# Patient Record
(demographics unavailable — no encounter records)

---

## 2018-02-25 NOTE — UC
Throat Pain/Nasal Neftaly HPI





- HPI Summary


HPI Summary: 





19 yo female wtih sore throat x 1 day


no f/c











- History of Current Complaint


Chief Complaint: UCGeneralIllness


Stated Complaint: SORE THROAT


Time Seen by Provider: 02/25/18 15:04


Hx Obtained From: Patient


Hx Last Menstrual Period: 2/22/18


Onset/Duration: Gradual Onset, Lasting Hours


Severity: Moderate


Pain Intensity: 7


Pain Scale Used: 0-10 Numeric


Cough: None





- Allergies/Home Medications


Allergies/Adverse Reactions: 


 Allergies











Allergy/AdvReac Type Severity Reaction Status Date / Time


 


No Known Allergies Allergy   Verified 02/25/18 15:09











Home Medications: 


 Home Medications





Oral Contraceptives DAILY 02/25/18 [History]











PMH/Surg Hx/FS Hx/Imm Hx


Previously Healthy: Yes





- Surgical History


Surgical History: None





- Family History


Known Family History: Positive: Cardiac Disease, Hypertension, Diabetes





- Social History


Alcohol Use: Rare


Substance Use Type: None


Smoking Status (MU): Never Smoked Tobacco





Review of Systems


Constitutional: Negative


Skin: Negative


Eyes: Negative


ENT: Sore Throat


Respiratory: Negative


Cardiovascular: Negative


Gastrointestinal: Negative


Genitourinary: Negative


Motor: Negative


Neurovascular: Negative


Musculoskeletal: Negative


Neurological: Negative


Psychological: Negative


Is Patient Immunocompromised?: No


All Other Systems Reviewed And Are Negative: Yes





Physical Exam


Triage Information Reviewed: Yes


Appearance: Well-Appearing, No Pain Distress, Well-Nourished


Vital Signs: 


 Initial Vital Signs











Temp  99.9 F   02/25/18 15:05


 


Pulse  80   02/25/18 15:05


 


Resp  16   02/25/18 15:05


 


BP  120/74   02/25/18 15:05


 


Pulse Ox  100   02/25/18 15:05











Eyes: Positive: Conjunctiva Clear


ENT: Positive: Hearing grossly normal, Tonsillar swelling, Tonsillar exudate.  

Negative: Nasal congestion, Nasal drainage, Muffled voice, Hoarse voice, Sinus 

tenderness, Uvula midline


Neck: Positive: Supple, Enlarged Nodes @ - ant cerv


Respiratory: Positive: Lungs clear, Normal breath sounds, No respiratory 

distress


Cardiovascular: Positive: RRR, No Murmur


Abdomen Description: Positive: Nontender, No Organomegaly, Soft.  Negative: CVA 

Tenderness (R), CVA Tenderness (L), Hepatomegaly, Splenomegaly


Musculoskeletal: Positive: ROM Intact, No Edema


Neurological: Positive: Alert


Skin Exam: Normal





Throat Pain/Nasal Course/Dx





- Course


Course Of Treatment: strep (-)





- Differential Dx/Diagnosis


Provider Diagnoses: acute exudative tonsillitis





Discharge





- Discharge Plan


Condition: Stable


Disposition: HOME


Prescriptions: 


Cephalexin CAP* [Keflex CAP*] 500 mg PO BID #20 cap


Patient Education Materials:  Mononucleosis (ED), Tonsillitis (ED)


Referrals: 


No Primary Care Phys,NOPCP [Primary Care Provider] - 


Additional Instructions: 


your strep test is pending





a blood test for mono is pending





IF YOU ARE (+) FOR MONO STOP THE ANTIBIOTIC





rest


fluids


tylenol or advil





recheck for new or worsening symptoms





recheck in 4-5 days if not improved